# Patient Record
Sex: MALE | Race: WHITE | NOT HISPANIC OR LATINO | URBAN - METROPOLITAN AREA
[De-identification: names, ages, dates, MRNs, and addresses within clinical notes are randomized per-mention and may not be internally consistent; named-entity substitution may affect disease eponyms.]

---

## 2017-02-16 ENCOUNTER — EMERGENCY (EMERGENCY)
Facility: HOSPITAL | Age: 57
LOS: 1 days | Discharge: HOME | End: 2017-02-16
Attending: EMERGENCY MEDICINE | Admitting: EMERGENCY MEDICINE
Payer: COMMERCIAL

## 2017-02-16 VITALS
RESPIRATION RATE: 18 BRPM | HEART RATE: 77 BPM | SYSTOLIC BLOOD PRESSURE: 121 MMHG | DIASTOLIC BLOOD PRESSURE: 77 MMHG | OXYGEN SATURATION: 98 % | TEMPERATURE: 98 F

## 2017-02-16 PROCEDURE — 99284 EMERGENCY DEPT VISIT MOD MDM: CPT | Mod: 25

## 2017-02-16 PROCEDURE — 71020: CPT | Mod: 26

## 2017-02-16 RX ORDER — CLARITHROMYCIN 500 MG
1 TABLET ORAL
Qty: 14 | Refills: 0 | OUTPATIENT
Start: 2017-02-16 | End: 2017-02-23

## 2017-02-16 NOTE — ED ADULT TRIAGE NOTE - CHIEF COMPLAINT QUOTE
pt c/o non productive cough since Wed , pt also c/o nasal congestion , horse voice, pt also c/o feeling tired , no fever at home

## 2017-02-17 PROCEDURE — 71046 X-RAY EXAM CHEST 2 VIEWS: CPT

## 2017-02-17 PROCEDURE — 99283 EMERGENCY DEPT VISIT LOW MDM: CPT | Mod: 25

## 2017-02-17 RX ADMIN — Medication 100 MILLIGRAM(S): at 00:10

## 2017-02-17 NOTE — ED ADULT NURSE NOTE - OBJECTIVE STATEMENT
Patient c/o non-productive cough and sore throat. Lungs clear throughout. Will continue to monitor patient.

## 2017-02-17 NOTE — ED PROVIDER NOTE - PHYSICAL EXAMINATION
CON: ao x 3, HENMT: clear oropharynx, no exudate or swelling noted, uvula midline, soft neck, HEAD: atraumatic, CV: rrr, equal pulses b/l, RESP: cta b/l, SKIN: no rash, MSK: moving all extremities spontaneously

## 2017-02-17 NOTE — ED PROVIDER NOTE - DIAGNOSTIC INTERPRETATION
ER Physician: Omari Henson  CHEST XRAY INTERPRETATION: lungs clear, heart shadow normal, bony structures intact

## 2017-02-17 NOTE — ED PROVIDER NOTE - MEDICAL DECISION MAKING DETAILS
cough, no fever, no respiratory distress, avss, lungs clear, will check xray, reassess, possibly bronchitis, given duration of cough sx, will start w/ clarithromycin.

## 2017-02-17 NOTE — ED PROVIDER NOTE - OBJECTIVE STATEMENT
56 yom pw sore throat since Tuesday which improved, but cough since Saturday.  no fever reported.  no sick contact.  no vomiting.  pt states initially had hoarse voice but improved as well.

## 2017-02-20 DIAGNOSIS — J02.9 ACUTE PHARYNGITIS, UNSPECIFIED: ICD-10-CM

## 2017-02-20 DIAGNOSIS — Z79.2 LONG TERM (CURRENT) USE OF ANTIBIOTICS: ICD-10-CM

## 2017-02-20 DIAGNOSIS — J40 BRONCHITIS, NOT SPECIFIED AS ACUTE OR CHRONIC: ICD-10-CM

## 2017-02-20 DIAGNOSIS — R05 COUGH: ICD-10-CM

## 2017-02-20 DIAGNOSIS — Z79.899 OTHER LONG TERM (CURRENT) DRUG THERAPY: ICD-10-CM

## 2019-10-11 NOTE — ED ADULT NURSE NOTE - PERIPHERAL VASCULAR WDL
Patient states he would like to talk to a nurse about erectile dis function. Patient is requesting a call back to discuss.   Pulses equal bilaterally, no edema present.